# Patient Record
Sex: MALE | Race: BLACK OR AFRICAN AMERICAN | NOT HISPANIC OR LATINO | Employment: STUDENT | URBAN - METROPOLITAN AREA
[De-identification: names, ages, dates, MRNs, and addresses within clinical notes are randomized per-mention and may not be internally consistent; named-entity substitution may affect disease eponyms.]

---
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
## 2025-06-26 ENCOUNTER — RESULTS FOLLOW-UP (OUTPATIENT)
Dept: FAMILY MEDICINE | Facility: CLINIC | Age: 20
End: 2025-06-26

## 2025-06-26 ENCOUNTER — OFFICE VISIT (OUTPATIENT)
Dept: FAMILY MEDICINE | Facility: CLINIC | Age: 20
End: 2025-06-26
Payer: COMMERCIAL

## 2025-06-26 ENCOUNTER — LAB VISIT (OUTPATIENT)
Dept: LAB | Facility: HOSPITAL | Age: 20
End: 2025-06-26
Attending: INTERNAL MEDICINE
Payer: COMMERCIAL

## 2025-06-26 VITALS
SYSTOLIC BLOOD PRESSURE: 126 MMHG | HEART RATE: 87 BPM | HEIGHT: 70 IN | BODY MASS INDEX: 21.21 KG/M2 | TEMPERATURE: 97 F | WEIGHT: 148.13 LBS | DIASTOLIC BLOOD PRESSURE: 80 MMHG | OXYGEN SATURATION: 98 % | RESPIRATION RATE: 18 BRPM

## 2025-06-26 DIAGNOSIS — Z00.00 ANNUAL PHYSICAL EXAM: ICD-10-CM

## 2025-06-26 DIAGNOSIS — Z00.00 ANNUAL PHYSICAL EXAM: Primary | ICD-10-CM

## 2025-06-26 PROBLEM — R00.0 TACHYCARDIA: Status: RESOLVED | Noted: 2024-04-23 | Resolved: 2025-06-26

## 2025-06-26 LAB
ABSOLUTE EOSINOPHIL (OHS): 0.04 K/UL
ABSOLUTE MONOCYTE (OHS): 0.61 K/UL (ref 0.3–1)
ABSOLUTE NEUTROPHIL COUNT (OHS): 1.36 K/UL (ref 1.8–7.7)
ALBUMIN SERPL BCP-MCNC: 4.7 G/DL (ref 3.5–5.2)
ALP SERPL-CCNC: 66 UNIT/L (ref 40–150)
ALT SERPL W/O P-5'-P-CCNC: 16 UNIT/L (ref 10–44)
ANION GAP (OHS): 7 MMOL/L (ref 8–16)
AST SERPL-CCNC: 21 UNIT/L (ref 11–45)
BASOPHILS # BLD AUTO: 0.05 K/UL
BASOPHILS NFR BLD AUTO: 1.4 %
BILIRUB SERPL-MCNC: 0.5 MG/DL (ref 0.1–1)
BUN SERPL-MCNC: 11 MG/DL (ref 6–20)
CALCIUM SERPL-MCNC: 9.4 MG/DL (ref 8.7–10.5)
CHLORIDE SERPL-SCNC: 106 MMOL/L (ref 95–110)
CHOLEST SERPL-MCNC: 156 MG/DL (ref 120–199)
CHOLEST/HDLC SERPL: 2.9 {RATIO} (ref 2–5)
CO2 SERPL-SCNC: 27 MMOL/L (ref 23–29)
CREAT SERPL-MCNC: 0.9 MG/DL (ref 0.5–1.4)
EAG (OHS): 94 MG/DL (ref 68–131)
ERYTHROCYTE [DISTWIDTH] IN BLOOD BY AUTOMATED COUNT: 12.2 % (ref 11.5–14.5)
GFR SERPLBLD CREATININE-BSD FMLA CKD-EPI: >60 ML/MIN/1.73/M2
GLUCOSE SERPL-MCNC: 85 MG/DL (ref 70–110)
HBA1C MFR BLD: 4.9 % (ref 4–5.6)
HCT VFR BLD AUTO: 41.8 % (ref 40–54)
HDLC SERPL-MCNC: 53 MG/DL (ref 40–75)
HDLC SERPL: 34 % (ref 20–50)
HGB BLD-MCNC: 14.1 GM/DL (ref 14–18)
IMM GRANULOCYTES # BLD AUTO: 0 K/UL (ref 0–0.04)
IMM GRANULOCYTES NFR BLD AUTO: 0 % (ref 0–0.5)
LDLC SERPL CALC-MCNC: 96.2 MG/DL (ref 63–159)
LYMPHOCYTES # BLD AUTO: 1.48 K/UL (ref 1–4.8)
MCH RBC QN AUTO: 30.6 PG (ref 27–31)
MCHC RBC AUTO-ENTMCNC: 33.7 G/DL (ref 32–36)
MCV RBC AUTO: 91 FL (ref 82–98)
NONHDLC SERPL-MCNC: 103 MG/DL
NUCLEATED RBC (/100WBC) (OHS): 0 /100 WBC
PLATELET # BLD AUTO: 257 K/UL (ref 150–450)
PMV BLD AUTO: 11 FL (ref 9.2–12.9)
POTASSIUM SERPL-SCNC: 4.4 MMOL/L (ref 3.5–5.1)
PROT SERPL-MCNC: 7.6 GM/DL (ref 6–8.4)
RBC # BLD AUTO: 4.61 M/UL (ref 4.6–6.2)
RELATIVE EOSINOPHIL (OHS): 1.1 %
RELATIVE LYMPHOCYTE (OHS): 41.8 % (ref 18–48)
RELATIVE MONOCYTE (OHS): 17.2 % (ref 4–15)
RELATIVE NEUTROPHIL (OHS): 38.5 % (ref 38–73)
SODIUM SERPL-SCNC: 140 MMOL/L (ref 136–145)
TRIGL SERPL-MCNC: 34 MG/DL (ref 30–150)
WBC # BLD AUTO: 3.54 K/UL (ref 3.9–12.7)

## 2025-06-26 PROCEDURE — 85025 COMPLETE CBC W/AUTO DIFF WBC: CPT

## 2025-06-26 PROCEDURE — 1159F MED LIST DOCD IN RCRD: CPT | Mod: CPTII,S$GLB,, | Performed by: INTERNAL MEDICINE

## 2025-06-26 PROCEDURE — 82465 ASSAY BLD/SERUM CHOLESTEROL: CPT

## 2025-06-26 PROCEDURE — 3079F DIAST BP 80-89 MM HG: CPT | Mod: CPTII,S$GLB,, | Performed by: INTERNAL MEDICINE

## 2025-06-26 PROCEDURE — 3074F SYST BP LT 130 MM HG: CPT | Mod: CPTII,S$GLB,, | Performed by: INTERNAL MEDICINE

## 2025-06-26 PROCEDURE — 1160F RVW MEDS BY RX/DR IN RCRD: CPT | Mod: CPTII,S$GLB,, | Performed by: INTERNAL MEDICINE

## 2025-06-26 PROCEDURE — 3008F BODY MASS INDEX DOCD: CPT | Mod: CPTII,S$GLB,, | Performed by: INTERNAL MEDICINE

## 2025-06-26 PROCEDURE — 99999 PR PBB SHADOW E&M-EST. PATIENT-LVL III: CPT | Mod: PBBFAC,,, | Performed by: INTERNAL MEDICINE

## 2025-06-26 PROCEDURE — 83036 HEMOGLOBIN GLYCOSYLATED A1C: CPT

## 2025-06-26 PROCEDURE — 84075 ASSAY ALKALINE PHOSPHATASE: CPT

## 2025-06-26 PROCEDURE — 36415 COLL VENOUS BLD VENIPUNCTURE: CPT | Mod: PO

## 2025-06-26 PROCEDURE — 99395 PREV VISIT EST AGE 18-39: CPT | Mod: S$GLB,,, | Performed by: INTERNAL MEDICINE

## 2025-06-26 NOTE — PROGRESS NOTES
Chief Complaint: Annual Exam, Bleeding/Bruising (Pt states there is some purple discoloration underneath his big toe ), and Abrasion (Pt states there are skin lesions on his back )      Parth Rasmussen  is a 20 y.o. year old patient who presents today for     History of Present Illness    CHIEF COMPLAINT:  Parth presents today for follow up    GASTROINTESTINAL:  He reports improvement in acid reflux symptoms and is not currently taking any medication for acid reflux. He denies active acid reflux symptoms. Recent endoscopy showed inflammation, which has reportedly improved since the procedure.    DERMATOLOGIC:  He reports multiple skin lesions, including a spot on his toe that developed while playing basketball. He describes recurring skin spots and is uncertain about potential melasma. One skin lesion has been present for approximately one year. He notes increased insect activity this season may contribute to skin changes.    EXERCISE:  He goes to the gym approximately twice weekly, primarily utilizing the treadmill for cardiovascular exercise.    LABS:  Previous year's labs were largely unremarkable with slightly low RBC count, normal kidney and liver function tests, normal cholesterol levels, and negative Hepatitis C.    PREVENTIVE CARE:  He maintains regular dental cleanings every six months but is overdue for an eye exam.      ROS:  General: -fever, -chills, -fatigue, -weight gain, -weight loss  Eyes: -vision changes, -redness, -discharge  ENT: -ear pain, -nasal congestion, -sore throat  Cardiovascular: -chest pain, -palpitations, -lower extremity edema  Respiratory: -cough, -shortness of breath  Gastrointestinal: -abdominal pain, -nausea, -vomiting, -diarrhea, -constipation, -blood in stool  Genitourinary: -dysuria, -hematuria, -frequency  Musculoskeletal: -joint pain, -muscle pain  Skin: -rash, +lesion  Neurological: -headache, -dizziness, -numbness, -tingling  Psychiatric: -anxiety, -depression,  -sleep difficulty         Past Medical History:   Diagnosis Date    Bronchitis     Cystic retinal tuft of right eye 07/31/2015    Gastroesophageal reflux disease without esophagitis        Past Surgical History:   Procedure Laterality Date    ESOPHAGOGASTRODUODENOSCOPY N/A 6/10/2024    Procedure: EGD (ESOPHAGOGASTRODUODENOSCOPY);  Surgeon: Leigh Ann Brown MD;  Location: [facility];  Service: Endoscopy;  Laterality: N/A;  Prep instructions sent via portal/given in clinic-  6/3/24- pc complete. DBM    TYMPANOSTOMY TUBE PLACEMENT          Family History   Problem Relation Name Age of Onset    No Known Problems Mother Wanda     Hypertension Father Jackson     No Known Problems Sister Jose     No Known Problems Sister Rivka     No Known Problems Brother Jackson     No Known Problems Brother Asan     Glaucoma Maternal Grandmother Alivia Broussard     Diabetes Maternal Grandmother Alivia Broussard     Colon cancer Neg Hx      Esophageal cancer Neg Hx      Prostate cancer Neg Hx          Social History     Socioeconomic History    Marital status: Single   Occupational History    Occupation: Student     Employer: [employer]     Comment: Physical Therapy   Tobacco Use    Smoking status: Never     Passive exposure: Never    Smokeless tobacco: Never   Substance and Sexual Activity    Alcohol use: No    Drug use: Never    Sexual activity: Not Currently     Social Drivers of Health     Financial Resource Strain: Patient Declined (4/2/2024)    Overall Financial Resource Strain (CARDIA)     Difficulty of Paying Living Expenses: Patient declined   Food Insecurity: No Food Insecurity (4/2/2024)    Hunger Vital Sign     Worried About Running Out of Food in the Last Year: Never true     Ran Out of Food in the Last Year: Never true   Transportation Needs: No Transportation Needs (4/2/2024)    PRAPARE - Transportation     Lack of Transportation (Medical): No     Lack of Transportation (Non-Medical): No   Physical Activity:  Sufficiently Active (4/2/2024)    Exercise Vital Sign     Days of Exercise per Week: 4 days     Minutes of Exercise per Session: 60 min   Stress: No Stress Concern Present (4/2/2024)    [employer] - Occupational Stress Questionnaire     Feeling of Stress : Not at all   Housing Stability: Low Risk  (4/2/2024)    Housing Stability Vital Sign     Unable to Pay for Housing in the Last Year: No     Number of Places Lived in the Last Year: 1     Unstable Housing in the Last Year: No       Current Medications[1]           Objective:      Vitals:    06/26/25 1051   BP: 126/80   Pulse: 87   Resp: 18   Temp: 97.4 °F (36.3 °C)       Physical Exam  Vitals and nursing note reviewed.   Constitutional:       Appearance: Normal appearance.   HENT:      Head: Normocephalic and atraumatic.   Cardiovascular:      Rate and Rhythm: Normal rate and regular rhythm.   Pulmonary:      Effort: Pulmonary effort is normal.      Breath sounds: Normal breath sounds. No wheezing or rales.   Abdominal:      General: Bowel sounds are normal.      Palpations: Abdomen is soft.      Tenderness: There is no abdominal tenderness.   Musculoskeletal:      Right lower leg: No edema.      Left lower leg: No edema.   Skin:     General: Skin is warm and dry.      Findings: Rash (tiny hyperpigmented spots on left shoulder and one on left back) present.   Neurological:      General: No focal deficit present.      Mental Status: He is alert and oriented to person, place, and time.   Psychiatric:         Mood and Affect: Mood normal.         Behavior: Behavior normal.          Assessment:       1. Annual physical exam          Plan:   1. Annual physical exam  Assessment & Plan:  Discussed HCM with patient    - patient up to date with vaccines. Deferred COVID vaccines  - annual labs ordered. Deferred STD screening  - counseled on diet and exercise  - advised patient to follow up with ophthalmologist and dentist every year  - reviewed  medical, surgical, family and social history      Orders:  -     CBC Auto Differential; Future; Expected date: 06/26/2025  -     Comprehensive Metabolic Panel; Future; Expected date: 06/26/2025  -     Hemoglobin A1C; Future; Expected date: 06/26/2025  -     Lipid Panel; Future; Expected date: 06/26/2025  -     Lipid Panel; Future; Expected date: 04/26/2026  -     Hemoglobin A1C; Future; Expected date: 04/26/2026  -     Comprehensive Metabolic Panel; Future; Expected date: 04/26/2026  -     CBC Auto Differential; Future; Expected date: 04/26/2026      ## GASTROESOPHAGEAL REFLUX DISEASE (GERD):  - Assessed patient's acid reflux symptoms, noting significant improvement confirmed by endoscopy showing decreased inflammation.  - Parth has discontinued previously prescribed medications (Protonix and Pepcid) due to symptom improvement.  - No current pharmacological intervention required as symptoms are well-controlled without medication.    ## INSECT BITES:  - Evaluated skin lesions on patient's toe and back, determining they are likely healing insect bites rather than urticaria.  - Lesions are not actively inflamed or pruritic.  - Advised patient to use mosquito repellent when outdoors to prevent future bites and educated about typical healing process and appearance of insect bites over time.    ## ANEMIA:  - Reviewed previous lab results showing slightly low RBC count but otherwise normal hematologic parameters.  - Ordered annual laboratory studies including CMP, CBC, hemoglobin A1C, and lipid panel.  - Parth instructed to complete fasting labs prior to next appointment.  - Will communicate results through portal when available and reassess anemia status at follow-up.    ## GENERAL HEALTH AND LIFESTYLE RECOMMENDATIONS:  - Discussed importance of establishing healthy habits early in life, particularly regarding exercise and diet, considering patient's age and current health status.  - Explained impact of aging on  metabolism and increased difficulty maintaining healthy weight later in life.  - Advised on long-term health risks of frequent fast food consumption.  - Parth to continue current exercise routine (gym 2 times weekly) and increase efforts to eat healthier by reducing fast food and highly processed foods.  - Recommend exploring meal kits or simple recipes as alternatives.    ## FOLLOW-UP:  - Follow up in 1 year.         Follow up in about 1 year (around 6/26/2026) for Annual.    This note was generated with the assistance of ambient listening technology. Verbal consent was obtained by the patient and accompanying visitor(s) for the recording of patient appointment to facilitate this note. I attest to having reviewed and edited the generated note for accuracy, though some syntax or spelling errors may persist. Please contact the author of this note for any clarification.                [1] No current outpatient medications on file.

## 2025-06-26 NOTE — ASSESSMENT & PLAN NOTE
Discussed HCM with patient    - patient up to date with vaccines. Deferred COVID vaccines  - annual labs ordered. Deferred STD screening  - counseled on diet and exercise  - advised patient to follow up with ophthalmologist and dentist every year  - reviewed medical, surgical, family and social history

## 2025-07-27 ENCOUNTER — PATIENT MESSAGE (OUTPATIENT)
Dept: FAMILY MEDICINE | Facility: CLINIC | Age: 20
End: 2025-07-27
Payer: COMMERCIAL

## 2025-07-27 DIAGNOSIS — L70.0 ACNE VULGARIS: Primary | ICD-10-CM
